# Patient Record
Sex: FEMALE | Race: BLACK OR AFRICAN AMERICAN | NOT HISPANIC OR LATINO | URBAN - METROPOLITAN AREA
[De-identification: names, ages, dates, MRNs, and addresses within clinical notes are randomized per-mention and may not be internally consistent; named-entity substitution may affect disease eponyms.]

---

## 2017-07-31 ENCOUNTER — APPOINTMENT (RX ONLY)
Dept: URBAN - METROPOLITAN AREA CLINIC 37 | Facility: CLINIC | Age: 52
Setting detail: DERMATOLOGY
End: 2017-07-31

## 2017-07-31 ENCOUNTER — APPOINTMENT (RX ONLY)
Dept: URBAN - METROPOLITAN AREA CLINIC 38 | Facility: CLINIC | Age: 52
Setting detail: DERMATOLOGY
End: 2017-07-31

## 2017-07-31 DIAGNOSIS — M06.3 RHEUMATOID NODULE: ICD-10-CM

## 2017-07-31 DIAGNOSIS — L84 CORNS AND CALLOSITIES: ICD-10-CM

## 2017-07-31 DIAGNOSIS — L82.1 OTHER SEBORRHEIC KERATOSIS: ICD-10-CM

## 2017-07-31 DIAGNOSIS — D86.3 SARCOIDOSIS OF SKIN: ICD-10-CM

## 2017-07-31 PROBLEM — K21.9 GASTRO-ESOPHAGEAL REFLUX DISEASE WITHOUT ESOPHAGITIS: Status: ACTIVE | Noted: 2017-07-31

## 2017-07-31 PROBLEM — E03.9 HYPOTHYROIDISM, UNSPECIFIED: Status: ACTIVE | Noted: 2017-07-31

## 2017-07-31 PROBLEM — M12.9 ARTHROPATHY, UNSPECIFIED: Status: ACTIVE | Noted: 2017-07-31

## 2017-07-31 PROBLEM — I10 ESSENTIAL (PRIMARY) HYPERTENSION: Status: ACTIVE | Noted: 2017-07-31

## 2017-07-31 PROBLEM — L30.9 DERMATITIS, UNSPECIFIED: Status: ACTIVE | Noted: 2017-07-31

## 2017-07-31 PROBLEM — M06.342 RHEUMATOID NODULE, LEFT HAND: Status: ACTIVE | Noted: 2017-07-31

## 2017-07-31 PROBLEM — L85.3 XEROSIS CUTIS: Status: ACTIVE | Noted: 2017-07-31

## 2017-07-31 PROCEDURE — ? MEDICATION COUNSELING

## 2017-07-31 PROCEDURE — ? PRESCRIPTION

## 2017-07-31 PROCEDURE — ? RECOMMENDATIONS

## 2017-07-31 PROCEDURE — ? TREATMENT REGIMEN

## 2017-07-31 PROCEDURE — ? COUNSELING

## 2017-07-31 PROCEDURE — 99202 OFFICE O/P NEW SF 15 MIN: CPT

## 2017-07-31 RX ORDER — MINOCYCLINE HYDROCHLORIDE 100 MG/1
CAPSULE ORAL ONCE A DAY
Qty: 30 | Refills: 0 | Status: ERX

## 2017-07-31 ASSESSMENT — LOCATION ZONE DERM
LOCATION ZONE: TRUNK
LOCATION ZONE: HAND
LOCATION ZONE: TRUNK
LOCATION ZONE: FEET
LOCATION ZONE: FEET
LOCATION ZONE: HAND

## 2017-07-31 ASSESSMENT — LOCATION DETAILED DESCRIPTION DERM
LOCATION DETAILED: LEFT RADIAL PALM
LOCATION DETAILED: LEFT MEDIAL PLANTAR MIDFOOT
LOCATION DETAILED: LEFT PLANTAR FOREFOOT OVERLYING 2ND METATARSAL
LOCATION DETAILED: LEFT PLANTAR FOREFOOT OVERLYING 4TH METATARSAL
LOCATION DETAILED: LEFT PLANTAR FOREFOOT OVERLYING 4TH METATARSAL
LOCATION DETAILED: LEFT MEDIAL PLANTAR MIDFOOT
LOCATION DETAILED: LEFT PLANTAR FOREFOOT OVERLYING 2ND METATARSAL
LOCATION DETAILED: LEFT SUPERIOR MEDIAL MIDBACK
LOCATION DETAILED: LEFT SUPERIOR MEDIAL MIDBACK
LOCATION DETAILED: LEFT RADIAL PALM

## 2017-07-31 ASSESSMENT — LOCATION SIMPLE DESCRIPTION DERM
LOCATION SIMPLE: LEFT LOWER BACK
LOCATION SIMPLE: LEFT HAND
LOCATION SIMPLE: LEFT PLANTAR SURFACE
LOCATION SIMPLE: LEFT PLANTAR SURFACE
LOCATION SIMPLE: LEFT HAND
LOCATION SIMPLE: LEFT LOWER BACK

## 2017-07-31 NOTE — HPI: SKIN LESIONS
Have Your Skin Lesions Been Treated?: not been treated
Is This A New Presentation, Or A Follow-Up?: Skin Lesions
How Severe Is Your Skin Lesion?: mild
Additional History: She has a family history of sarcoidosis and she has a personal history of rheumatoid arthritis with a recent CXR that was negative.

## 2017-07-31 NOTE — PROCEDURE: TREATMENT REGIMEN
Initiate Treatment: Plan to start minocycline 100mg once a day for and Desonate topical gel once daily for four weeks.
Plan: Sample of desonate gel given, Rx for minocycline 100mg given, plan to use once daily for four weeks and follow up with pt in 5 weeks. Given FMHx of sarcoidosis and her personal history of rheumatoid arthritis autoimmune trigger here does become a concern to rule out sooner rather than later.  We will have patient fill out out record release form for most recent labs, and review at follow up. Counseled pt regarding plan for treatment vs punch biopsy and we will proceed with treatment first.
Detail Level: Simple
Samples Given: Desonate gel

## 2017-07-31 NOTE — PROCEDURE: RECOMMENDATIONS
Recommendations (Free Text): f/u accordingly as planned with rheumatologist.
Detail Level: Simple
Recommendation Preamble: The following recommendations were made during the visit:

## 2017-07-31 NOTE — PROCEDURE: TREATMENT REGIMEN
Samples Given: Desonate gel
Detail Level: Simple
Plan: Sample of desonate gel given, Rx for minocycline 100mg given, plan to use once daily for four weeks and follow up with pt in 5 weeks. Given FMHx of sarcoidosis and her personal history of rheumatoid arthritis autoimmune trigger here does become a concern to rule out sooner rather than later.  We will have patient fill out out record release form for most recent labs, and review at follow up. Counseled pt regarding plan for treatment vs punch biopsy and we will proceed with treatment first.
Initiate Treatment: Plan to start minocycline 100mg once a day for and Desonate topical gel once daily for four weeks.

## 2017-07-31 NOTE — HPI: SKIN LESIONS
How Severe Is Your Skin Lesion?: mild
Have Your Skin Lesions Been Treated?: not been treated
Is This A New Presentation, Or A Follow-Up?: Skin Lesions
Additional History: She has a family history of sarcoidosis and she has a personal history of rheumatoid arthritis with a recent CXR that was negative.

## 2017-07-31 NOTE — PROCEDURE: RECOMMENDATIONS
Detail Level: Simple
Recommendations (Free Text): f/u accordingly as planned with rheumatologist.
Recommendation Preamble: The following recommendations were made during the visit:

## 2020-06-03 ENCOUNTER — APPOINTMENT (RX ONLY)
Dept: URBAN - METROPOLITAN AREA CLINIC 38 | Facility: CLINIC | Age: 55
Setting detail: DERMATOLOGY
End: 2020-06-03

## 2020-06-03 ENCOUNTER — APPOINTMENT (RX ONLY)
Dept: URBAN - METROPOLITAN AREA CLINIC 37 | Facility: CLINIC | Age: 55
Setting detail: DERMATOLOGY
End: 2020-06-03

## 2020-06-03 DIAGNOSIS — D18.0 HEMANGIOMA: ICD-10-CM

## 2020-06-03 DIAGNOSIS — L91.8 OTHER HYPERTROPHIC DISORDERS OF THE SKIN: ICD-10-CM

## 2020-06-03 DIAGNOSIS — D86.3 SARCOIDOSIS OF SKIN: ICD-10-CM

## 2020-06-03 PROBLEM — L30.9 DERMATITIS, UNSPECIFIED: Status: ACTIVE | Noted: 2020-06-03

## 2020-06-03 PROBLEM — D18.01 HEMANGIOMA OF SKIN AND SUBCUTANEOUS TISSUE: Status: ACTIVE | Noted: 2020-06-03

## 2020-06-03 PROCEDURE — ? PHOTO-DOCUMENTATION

## 2020-06-03 PROCEDURE — ? COUNSELING

## 2020-06-03 PROCEDURE — 99213 OFFICE O/P EST LOW 20 MIN: CPT

## 2020-06-03 PROCEDURE — ? PRESCRIPTION

## 2020-06-03 PROCEDURE — ? TREATMENT REGIMEN

## 2020-06-03 RX ORDER — TACROLIMUS 1 MG/G
THIN LAYER OINTMENT TOPICAL QD
Qty: 1 | Refills: 1 | Status: ERX | COMMUNITY
Start: 2020-06-03

## 2020-06-03 RX ORDER — ALCLOMETASONE DIPROPIONATE 0.5 MG/G
THIN LAYER OINTMENT TOPICAL QHS
Qty: 1 | Refills: 0 | Status: ERX | COMMUNITY
Start: 2020-06-03

## 2020-06-03 RX ADMIN — TACROLIMUS THIN LAYER: 1 OINTMENT TOPICAL at 00:00

## 2020-06-03 RX ADMIN — ALCLOMETASONE DIPROPIONATE THIN LAYER: 0.5 OINTMENT TOPICAL at 00:00

## 2020-06-03 ASSESSMENT — LOCATION DETAILED DESCRIPTION DERM
LOCATION DETAILED: SUBXIPHOID
LOCATION DETAILED: RIGHT SUPERIOR LATERAL UPPER BACK
LOCATION DETAILED: RIGHT INFRAMAMMARY CREASE (INNER QUADRANT)
LOCATION DETAILED: LEFT LATERAL CANTHUS
LOCATION DETAILED: RIGHT INFRAMAMMARY CREASE (INNER QUADRANT)
LOCATION DETAILED: SUBXIPHOID
LOCATION DETAILED: RIGHT SUPERIOR LATERAL UPPER BACK
LOCATION DETAILED: LEFT LATERAL CANTHUS

## 2020-06-03 ASSESSMENT — LOCATION SIMPLE DESCRIPTION DERM
LOCATION SIMPLE: LEFT EYELID
LOCATION SIMPLE: RIGHT BACK
LOCATION SIMPLE: LEFT EYELID
LOCATION SIMPLE: RIGHT BREAST
LOCATION SIMPLE: ABDOMEN
LOCATION SIMPLE: ABDOMEN
LOCATION SIMPLE: RIGHT BACK
LOCATION SIMPLE: RIGHT BREAST

## 2020-06-03 ASSESSMENT — LOCATION ZONE DERM
LOCATION ZONE: EYELID
LOCATION ZONE: EYELID
LOCATION ZONE: TRUNK
LOCATION ZONE: TRUNK

## 2020-06-03 NOTE — PROCEDURE: TREATMENT REGIMEN
Detail Level: Simple
Initiate Treatment: Alclometasone qhs x 2 weeks\\nProtopic once daily after the 2 weeks
Plan: Pt is seeing a rheumatologist regularly and has blood work done at that time. Requested that to send results to office and sign MRF for labs/prior records and for labs she is having drawn next week. \\n\\nCould consider a biopsy that while a difficult area with proxmity to the eye would want to make sure we have addressed possible treatment options prior to such a procedure however to obtain diagnosis definitely will need to consider.  After review of labs will also consider adding however biopsy is definitive means for skin lesions

## 2020-06-03 NOTE — PROCEDURE: TREATMENT REGIMEN
Initiate Treatment: Alclometasone qhs x 2 weeks\\nProtopic once daily after the 2 weeks
Plan: Pt is seeing a rheumatologist regularly and has blood work done at that time. Requested that to send results to office and sign MRF for labs/prior records and for labs she is having drawn next week. \\n\\nCould consider a biopsy that while a difficult area with proxmity to the eye would want to make sure we have addressed possible treatment options prior to such a procedure however to obtain diagnosis definitely will need to consider.  After review of labs will also consider adding however biopsy is definitive means for skin lesions
Detail Level: Simple

## 2020-06-03 NOTE — HPI: SKIN LESION
What Type Of Note Output Would You Prefer (Optional)?: Bullet Format
How Severe Is Your Skin Lesion?: mild
Has Your Skin Lesion Been Treated?: not been treated
Is This A New Presentation, Or A Follow-Up?: Skin Lesion
Additional History: Pt states lesion at the corner of the eye that has been itchy in the past but not at this time. Pt states lesions are nodular like and seems to have decreased in size. She states she does have a h/o Rheumatoid arthritis and a family history of scardosis. She has had a CXR within the last 2-4 years and it was normal. Patient has no other rashes on her body and her only systemic concerns are the joint pain and she contributes that to her RA

## 2022-07-20 ENCOUNTER — APPOINTMENT (RX ONLY)
Dept: URBAN - METROPOLITAN AREA CLINIC 33 | Facility: CLINIC | Age: 57
Setting detail: DERMATOLOGY
End: 2022-07-20

## 2022-07-20 DIAGNOSIS — L73.8 OTHER SPECIFIED FOLLICULAR DISORDERS: ICD-10-CM

## 2022-07-20 PROBLEM — D23.122 OTHER BENIGN NEOPLASM OF SKIN OF LEFT LOWER EYELID, INCLUDING CANTHUS: Status: ACTIVE | Noted: 2022-07-20

## 2022-07-20 PROBLEM — D23.112 OTHER BENIGN NEOPLASM OF SKIN OF RIGHT LOWER EYELID, INCLUDING CANTHUS: Status: ACTIVE | Noted: 2022-07-20

## 2022-07-20 PROCEDURE — ? REFERRAL

## 2022-07-20 PROCEDURE — ? COUNSELING

## 2022-07-20 PROCEDURE — ? ADDITIONAL NOTES

## 2022-07-20 PROCEDURE — 99212 OFFICE O/P EST SF 10 MIN: CPT

## 2022-07-20 ASSESSMENT — LOCATION SIMPLE DESCRIPTION DERM: LOCATION SIMPLE: INFERIOR FOREHEAD

## 2022-07-20 ASSESSMENT — LOCATION DETAILED DESCRIPTION DERM: LOCATION DETAILED: INFERIOR MID FOREHEAD

## 2022-07-20 ASSESSMENT — LOCATION ZONE DERM: LOCATION ZONE: FACE

## 2022-07-20 NOTE — PROCEDURE: ADDITIONAL NOTES
Detail Level: Simple
Additional Notes: After obtaining verbal consent, diagnosis supported by puncture of these nodules with 25g needle and spontaneous discharge of small amount of clear fluid. Recommended patient consult with oculoplastics if nodules reform and she is interested in excision.
Render Risk Assessment In Note?: no

## 2022-07-20 NOTE — HPI: SKIN LESIONS
How Severe Is Your Skin Lesion?: mild
Have Your Skin Lesions Been Treated?: not been treated
Is This A New Presentation, Or A Follow-Up?: Skin Lesions
Additional History: Pt has lesions around eyes and would like evaluated.

## 2022-07-20 NOTE — HPI: SKIN LESION
Patient request Breo to be sent to pharmacy for 90 day supply - it is cheaper for her.  
How Severe Is Your Skin Lesion?: mild
Has Your Skin Lesion Been Treated?: not been treated
Is This A New Presentation, Or A Follow-Up?: Skin Lesion